# Patient Record
Sex: MALE | Race: BLACK OR AFRICAN AMERICAN | NOT HISPANIC OR LATINO | URBAN - METROPOLITAN AREA
[De-identification: names, ages, dates, MRNs, and addresses within clinical notes are randomized per-mention and may not be internally consistent; named-entity substitution may affect disease eponyms.]

---

## 2018-12-01 ENCOUNTER — EMERGENCY (EMERGENCY)
Facility: HOSPITAL | Age: 33
LOS: 1 days | Discharge: HOME | End: 2018-12-01
Attending: EMERGENCY MEDICINE | Admitting: EMERGENCY MEDICINE
Payer: SELF-PAY

## 2018-12-01 VITALS
HEART RATE: 97 BPM | TEMPERATURE: 97 F | WEIGHT: 207.01 LBS | DIASTOLIC BLOOD PRESSURE: 127 MMHG | SYSTOLIC BLOOD PRESSURE: 168 MMHG | RESPIRATION RATE: 16 BRPM | OXYGEN SATURATION: 100 %

## 2018-12-01 DIAGNOSIS — F32.9 MAJOR DEPRESSIVE DISORDER, SINGLE EPISODE, UNSPECIFIED: ICD-10-CM

## 2018-12-01 DIAGNOSIS — F41.1 GENERALIZED ANXIETY DISORDER: ICD-10-CM

## 2018-12-01 PROCEDURE — 99284 EMERGENCY DEPT VISIT MOD MDM: CPT

## 2018-12-01 PROCEDURE — 90792 PSYCH DIAG EVAL W/MED SRVCS: CPT

## 2018-12-01 PROCEDURE — 99284 EMERGENCY DEPT VISIT MOD MDM: CPT | Mod: 25

## 2018-12-01 NOTE — ED ADULT NURSE NOTE - NSIMPLEMENTINTERV_GEN_ALL_ED
Implemented All Universal Safety Interventions:  [place] to call system. Call bell, personal items and telephone within reach. Instruct patient to call for assistance. Room bathroom lighting operational. Non-slip footwear when patient is off stretcher. Physically safe environment: no spills, clutter or unnecessary equipment. Stretcher in lowest position, wheels locked, appropriate side rails in place.

## 2018-12-01 NOTE — ED BEHAVIORAL HEALTH ASSESSMENT NOTE - SUICIDE PROTECTIVE FACTORS
Responsibility to family and others/Ability to cope with stress/Identifies reasons for living/Supportive social network or family

## 2018-12-01 NOTE — ED PROVIDER NOTE - MEDICAL DECISION MAKING DETAILS
Patient in ED with concern for HTN and depressed mood.  No risk to self or others.  BP noted, asymptomatic.  Psych consulted and in ED to offer patient resources.  Patient has plan for follow up as per resources discussed with psychiatrist and is thought to be appropriate for discharge/outpatient follow up.  Patient will be provided with rx for HCTZ 25 mg and PCP referral.  Patient is advised to follow up in 1-2 days for re evaluation and will return to ED immediately should symptoms worsen or if there is any concern prior to this recommended follow up.  Patient is aware of plan and verbalizes his understanding.

## 2018-12-01 NOTE — ED BEHAVIORAL HEALTH ASSESSMENT NOTE - DESCRIPTION
Patient remains calm, cooperative, hopeful about his coming to ER to get help. HTN patient came from [place] at age 12, felt isolated in school, was in college and kept working after that

## 2018-12-01 NOTE — ED BEHAVIORAL HEALTH ASSESSMENT NOTE - DETAILS
Discussed with ER Attending patient reports, that several family members suffer from depression/anxiety. patient states, that always had high BP, especially when gets upset. He was prescribed HTZ before patient lost 60 pounds by his report, was advised to get full medical work up

## 2018-12-01 NOTE — ED BEHAVIORAL HEALTH ASSESSMENT NOTE - SUMMARY
Patient, 33 y.o., employed, currently stays with his mother reports being depressed, anxious for many years, which 'he tries to fight on his own'. Patient's condition worsened for the last several months, when he had more symptoms with insomnia, low energy, poor appetite/lost weight, increased anxiety, obsessive thoughts. Patient denies any SI, HI and brought himself for help with his symptoms, condition.

## 2018-12-01 NOTE — ED PROVIDER NOTE - OBJECTIVE STATEMENT
33 year old male presents to ED with concern for elevated blood pressure and depressed mood over the past several months.  Patient states he was previously on HCTZ for blood pressure control, though has not been able to take the medication due to difficulties with insurance and getting back to his physician.  He denies suicidal or homicidal ideations, however does state he has been feeling increasingly depressed and is having difficulty coping with day to day stressors without becoming upset.  He denies associated chest pain, shortness of breath, changes to vision, abdominal pain or any additional acute complaints or concerns at this time.

## 2018-12-01 NOTE — ED BEHAVIORAL HEALTH ASSESSMENT NOTE - DESCRIPTION (FIRST USE, LAST USE, QUANTITY, FREQUENCY, DURATION)
patient reports smoking from 29-32 patient reports smoking cannabis on a 'frequent basis for the last 10 months

## 2018-12-01 NOTE — ED BEHAVIORAL HEALTH ASSESSMENT NOTE - OTHER
sisiter patient feels, that he has been fighting those symptoms for years with no help problems with personal relationship due to several variables

## 2018-12-01 NOTE — ED BEHAVIORAL HEALTH ASSESSMENT NOTE - RISK ASSESSMENT
Patient has a supportive family network, including his sister, mother.  He has low risk as would like to get better, future oriented.

## 2018-12-01 NOTE — ED ADULT NURSE NOTE - OBJECTIVE STATEMENT
Patient is a 34yo male reporting hypertension and non-adherence to anti-hypertensive medications since he lost his insurance a couple months ago. Patient denies any current symptoms, reports intermittent headaches. Patient also reports recent depressive symptoms. Denies SI/HI.

## 2018-12-01 NOTE — ED ADULT TRIAGE NOTE - CHIEF COMPLAINT QUOTE
high blood pressure, intermittent headache and lightheadedness x 1 year.  Hx HTN (ran out of meds for 1 year). EKG done

## 2018-12-01 NOTE — ED BEHAVIORAL HEALTH ASSESSMENT NOTE - HPI (INCLUDE ILLNESS QUALITY, SEVERITY, DURATION, TIMING, CONTEXT, MODIFYING FACTORS, ASSOCIATED SIGNS AND SYMPTOMS)
Patient, 33 y.o. employed, part time, currently staying with his mother reported on being constantly worrying about many things, including 'where he is at in his life', 'why he is so unhappy with his life, irritable'. Patient states, that he had been feeling depressed for months, crying, staying to himself, feels isolated. He lost 60 pounds for the last several months according to his report. Feeling low energy, decreased appetite, insomnia. Denies any SI and wanted to find solution to his problems.  His sister and mother are also concern and would like him to get better. In  terms of the latest triggers, he reported, that the relationship with his GF became difficult and they were fighting on even daily routine things. Patient felt emotionally volnerable, had difficulties to explain what was bothering him, which would make him irritable and reactive and remorseful about 'him being snappy'  afterwords.   Patient reported on smoking cannabis for the last 10months in 'a hope to decrease his worries and depression'.

## 2018-12-01 NOTE — ED BEHAVIORAL HEALTH ASSESSMENT NOTE - OTHER PAST PSYCHIATRIC HISTORY (INCLUDE DETAILS REGARDING ONSET, COURSE OF ILLNESS, INPATIENT/OUTPATIENT TREATMENT)
Patient reports, that he had some episodes of feeling sad, depressed, down before, which were superimposed on his constant feeling 'worrying, anxious' and irritable at times.

## 2018-12-05 DIAGNOSIS — I10 ESSENTIAL (PRIMARY) HYPERTENSION: ICD-10-CM

## 2018-12-05 DIAGNOSIS — Z79.891 LONG TERM (CURRENT) USE OF OPIATE ANALGESIC: ICD-10-CM

## 2018-12-05 DIAGNOSIS — F32.9 MAJOR DEPRESSIVE DISORDER, SINGLE EPISODE, UNSPECIFIED: ICD-10-CM

## 2019-05-13 NOTE — ED ADULT NURSE NOTE - NSFALLRSKASSESSTYPE_ED_ALL_ED
Patient informed that medication has been approved and sent to preferred pharmacy.    Initial (On Arrival)

## 2020-02-12 NOTE — ED ADULT NURSE NOTE - PMH
Clean great toe with hydrogen peroxide nightly, then apply over-the-counter generic Lamisil AF cream and cover with a Band-Aid.       Be faithful and do this daily
HTN (hypertension)
